# Patient Record
Sex: FEMALE | Race: AMERICAN INDIAN OR ALASKA NATIVE
[De-identification: names, ages, dates, MRNs, and addresses within clinical notes are randomized per-mention and may not be internally consistent; named-entity substitution may affect disease eponyms.]

---

## 2019-02-25 ENCOUNTER — HOSPITAL ENCOUNTER (EMERGENCY)
Dept: HOSPITAL 5 - ED | Age: 29
LOS: 2 days | Discharge: HOME | End: 2019-02-27
Payer: OTHER GOVERNMENT

## 2019-02-25 DIAGNOSIS — F23: Primary | ICD-10-CM

## 2019-02-25 DIAGNOSIS — F17.200: ICD-10-CM

## 2019-02-25 LAB
BASOPHILS # (AUTO): 0 K/MM3 (ref 0–0.1)
BASOPHILS NFR BLD AUTO: 0.1 % (ref 0–1.8)
BUN SERPL-MCNC: 13 MG/DL (ref 7–17)
BUN/CREAT SERPL: 13 %
CALCIUM SERPL-MCNC: 8.7 MG/DL (ref 8.4–10.2)
EOSINOPHIL # BLD AUTO: 0 K/MM3 (ref 0–0.4)
EOSINOPHIL NFR BLD AUTO: 0 % (ref 0–4.3)
HCT VFR BLD CALC: 34.9 % (ref 30.3–42.9)
HEMOLYSIS INDEX: 7
HGB BLD-MCNC: 11.9 GM/DL (ref 10.1–14.3)
LYMPHOCYTES # BLD AUTO: 1.3 K/MM3 (ref 1.2–5.4)
LYMPHOCYTES NFR BLD AUTO: 6.3 % (ref 13.4–35)
MCHC RBC AUTO-ENTMCNC: 34 % (ref 30–34)
MCV RBC AUTO: 89 FL (ref 79–97)
MONOCYTES # (AUTO): 0.9 K/MM3 (ref 0–0.8)
MONOCYTES % (AUTO): 4.5 % (ref 0–7.3)
PLATELET # BLD: 340 K/MM3 (ref 140–440)
RBC # BLD AUTO: 3.92 M/MM3 (ref 3.65–5.03)

## 2019-02-25 PROCEDURE — G0480 DRUG TEST DEF 1-7 CLASSES: HCPCS

## 2019-02-25 PROCEDURE — 71045 X-RAY EXAM CHEST 1 VIEW: CPT

## 2019-02-25 PROCEDURE — 81001 URINALYSIS AUTO W/SCOPE: CPT

## 2019-02-25 PROCEDURE — 80048 BASIC METABOLIC PNL TOTAL CA: CPT

## 2019-02-25 PROCEDURE — 80307 DRUG TEST PRSMV CHEM ANLYZR: CPT

## 2019-02-25 PROCEDURE — 80320 DRUG SCREEN QUANTALCOHOLS: CPT

## 2019-02-25 PROCEDURE — 85025 COMPLETE CBC W/AUTO DIFF WBC: CPT

## 2019-02-25 PROCEDURE — 84703 CHORIONIC GONADOTROPIN ASSAY: CPT

## 2019-02-25 PROCEDURE — 99284 EMERGENCY DEPT VISIT MOD MDM: CPT

## 2019-02-25 PROCEDURE — 36415 COLL VENOUS BLD VENIPUNCTURE: CPT

## 2019-02-26 LAB
BENZODIAZEPINES SCREEN,URINE: (no result)
BILIRUB UR QL STRIP: (no result)
BLOOD UR QL VISUAL: (no result)
METHADONE SCREEN,URINE: (no result)
MUCOUS THREADS #/AREA URNS HPF: (no result) /HPF
OPIATE SCREEN,URINE: (no result)
PH UR STRIP: 5 [PH] (ref 5–7)
RBC #/AREA URNS HPF: 4 /HPF (ref 0–6)
UROBILINOGEN UR-MCNC: < 2 MG/DL (ref ?–2)
WBC #/AREA URNS HPF: 3 /HPF (ref 0–6)

## 2019-02-26 RX ADMIN — HYDROXYZINE PAMOATE SCH MG: 25 CAPSULE ORAL at 14:00

## 2019-02-26 RX ADMIN — HYDROXYZINE PAMOATE SCH MG: 25 CAPSULE ORAL at 22:06

## 2019-02-26 NOTE — XRAY REPORT
FINAL REPORT



PROCEDURE:  XR CHEST 1V AP



TECHNIQUE:  Chest radiograph anteroposterior view. CPT 90232







HISTORY:  cough 



COMPARISON:  No prior studies are available for comparison.



FINDINGS:  

Heart: Normal.



Mediastinum/Vessels: Normal.



Lungs/Pleural space: Normal.



Bony thorax: No acute osseous abnormality.



Life support devices: None.



IMPRESSION:  

No acute cardiopulmonary abnormality.
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
## 2019-02-26 NOTE — EMERGENCY DEPARTMENT REPORT
ED Psych HPI





- General


Chief Complaint: Altered Mental Status


Stated Complaint: ALTERED MENTAL STATUS


Time Seen by Provider: 02/25/19 23:30


Source: EMS


Mode of arrival: Stretcher





- History of Present Illness


Initial Comments: 





Patient is 29 years old female with history of bipolar.  Patient presented to 

the ER crying stating that she is having trouble with her significant other and 

she feels very depressed.  Patient is unable to give a comprehensive history.  

She is inappropriate crying.  Patient is very paranoid.  Patient is in obvious 

acute psychosis.  When asked about visual or auditory hallucination patient 

denied.  Patient also denies suicidal or homicidal ideation.


MD Complaint: feels depressed, altered mental status


Associated Psychiatric Symptoms: depression





- Related Data


                                Home Medications











 Medication  Instructions  Recorded  Confirmed  Last Taken


 


No Known Home Medications [No  02/26/19 02/26/19 Unknown





Reported Home Medications]    











                                    Allergies











Allergy/AdvReac Type Severity Reaction Status Date / Time


 


No Known Allergies Allergy   Unverified 02/25/19 23:02














ED Review of Systems


ROS: 


Stated complaint: ALTERED MENTAL STATUS


Other details as noted in HPI





Comment: All other systems reviewed and negative


Constitutional: denies: chills, fever


Respiratory: denies: cough, orthopnea, shortness of breath, SOB with exertion, 

SOB at rest, wheezing


Cardiovascular: palpitations.  denies: chest pain, dyspnea on exertion


Gastrointestinal: denies: abdominal pain, nausea, vomiting, diarrhea, 

constipation, hematemesis, hematochezia


Musculoskeletal: denies: back pain


Neurological: denies: headache, weakness, numbness, paresthesias, confusion


Psychiatric: depression





ED Past Medical Hx





- Past Medical History


Previous Medical History?: No





- Social History


Smoking Status: Current Every Day Smoker


Substance Use Type: Alcohol, Other





- Medications


Home Medications: 


                                Home Medications











 Medication  Instructions  Recorded  Confirmed  Last Taken  Type


 


No Known Home Medications [No  02/26/19 02/26/19 Unknown History





Reported Home Medications]     














ED Physical Exam





- General


Limitations: No Limitations


General appearance: alert, in no apparent distress, anxious, other (agitated)





- Head


Head exam: Present: atraumatic, normocephalic, normal inspection





- Eye


Eye exam: Present: normal appearance





- ENT


ENT exam: Present: normal exam, normal orophraynx, mucous membranes moist





- Neck


Neck exam: Present: normal inspection, full ROM.  Absent: tenderness, 

meningismus





- Respiratory


Respiratory exam: Present: normal lung sounds bilaterally.  Absent: respiratory 

distress, wheezes, rales, rhonchi, accessory muscle use, prolonged expiratory





- Cardiovascular


Cardiovascular Exam: Present: tachycardia





- GI/Abdominal


GI/Abdominal exam: Present: soft, normal bowel sounds.  Absent: distended, 

tenderness, guarding, rebound, rigid, bruit, pulsatile mass, hernia





- Extremities Exam


Extremities exam: Present: normal inspection, full ROM, normal capillary refill.

 Absent: pedal edema, calf tenderness





- Back Exam


Back exam: Present: normal inspection, full ROM





- Neurological Exam


Neurological exam: Present: alert, oriented X3, normal gait, reflexes normal





- Psychiatric


Psychiatric exam: Present: agitated, anxious, manic.  Absent: homicidal 

ideation, suicidal ideation





- Skin


Skin exam: Present: warm, intact, normal color





ED Course


                                   Vital Signs











  02/25/19 02/25/19 02/25/19





  23:17 23:31 23:45


 


Pulse Rate 111 H 116 H 113 H


 


Respiratory 16 19 21





Rate   


 


Blood Pressure   108/63


 


O2 Sat by Pulse 93 100 





Oximetry   














  02/26/19 02/26/19 02/26/19





  00:00 00:15 00:30


 


Pulse Rate 123 H 109 H 102 H


 


Respiratory 13 22 17





Rate   


 


Blood Pressure 126/78 126/78 105/58


 


O2 Sat by Pulse 100  





Oximetry   














  02/26/19 02/26/19 02/26/19





  00:45 01:00 01:15


 


Pulse Rate 98 H 96 H 87


 


Respiratory 19 19 15





Rate   


 


Blood Pressure 105/58 108/67 108/67


 


O2 Sat by Pulse   





Oximetry   














  02/26/19





  01:30


 


Pulse Rate 91 H


 


Respiratory 18





Rate 


 


Blood Pressure 104/62


 


O2 Sat by Pulse 





Oximetry 














ED Medical Decision Making





- Lab Data


Result diagrams: 


                                 02/25/19 23:18





                                 02/25/19 23:18





- Medical Decision Making





Patient is 29 years old female with history of bipolar.  Patient presented to 

the ER crying stating that she is having trouble with her significant other and 

she feels very depressed.  Patient is unable to give a comprehensive history.  

She is inappropriate crying.  Patient is very paranoid.  Patient is in obvious 

acute psychosis.  When asked about visual or auditory hallucination patient 

denied.  Patient also denies suicidal or homicidal ideation.





Patient is medically clear for inpatient psychiatric admission for acute 

psychosis.


Critical care attestation.: 


If time is entered above; I have spent that time in minutes in the direct care 

of this critically ill patient, excluding procedure time.








ED Disposition


Clinical Impression: 


 Acute psychosis, Bipolar disorder





Disposition: [id number]/TX-[id number] PSY HOSP/PSY UNIT


Is pt being admited?: No


Condition: Stable

## 2019-02-27 VITALS — DIASTOLIC BLOOD PRESSURE: 66 MMHG | SYSTOLIC BLOOD PRESSURE: 101 MMHG

## 2019-02-27 RX ADMIN — HYDROXYZINE PAMOATE SCH MG: 25 CAPSULE ORAL at 11:00
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
